# Patient Record
Sex: FEMALE | Race: WHITE | NOT HISPANIC OR LATINO | Employment: FULL TIME | URBAN - METROPOLITAN AREA
[De-identification: names, ages, dates, MRNs, and addresses within clinical notes are randomized per-mention and may not be internally consistent; named-entity substitution may affect disease eponyms.]

---

## 2019-02-17 ENCOUNTER — OFFICE VISIT (OUTPATIENT)
Dept: URGENT CARE | Facility: CLINIC | Age: 29
End: 2019-02-17
Payer: MEDICAID

## 2019-02-17 VITALS
TEMPERATURE: 97.9 F | BODY MASS INDEX: 24.99 KG/M2 | OXYGEN SATURATION: 97 % | WEIGHT: 150 LBS | HEIGHT: 65 IN | RESPIRATION RATE: 16 BRPM | SYSTOLIC BLOOD PRESSURE: 116 MMHG | HEART RATE: 104 BPM | DIASTOLIC BLOOD PRESSURE: 68 MMHG

## 2019-02-17 DIAGNOSIS — R68.89 FLU-LIKE SYMPTOMS: ICD-10-CM

## 2019-02-17 DIAGNOSIS — J06.9 VIRAL URI WITH COUGH: ICD-10-CM

## 2019-02-17 LAB
FLUAV+FLUBV AG SPEC QL IA: NEGATIVE
INT CON NEG: NEGATIVE
INT CON POS: POSITIVE

## 2019-02-17 PROCEDURE — 87804 INFLUENZA ASSAY W/OPTIC: CPT | Performed by: NURSE PRACTITIONER

## 2019-02-17 PROCEDURE — 99202 OFFICE O/P NEW SF 15 MIN: CPT | Performed by: NURSE PRACTITIONER

## 2019-02-17 ASSESSMENT — ENCOUNTER SYMPTOMS
FEVER: 0
WHEEZING: 0
CHILLS: 0
MYALGIAS: 1
DIARRHEA: 0
SORE THROAT: 0
COUGH: 1
SPUTUM PRODUCTION: 1
ORTHOPNEA: 0
EYE DISCHARGE: 0
NAUSEA: 0
HEADACHES: 1
SHORTNESS OF BREATH: 0

## 2019-02-17 NOTE — LETTER
February 17, 2019        Brittney Travis  [address] [place] [id number]        Brittney was seen in our clinic today and she is cleared to return to work tomorrow.  If you have any questions or concerns, please don't hesitate to call.        Sincerely,        NETO Grover.CARMEN.    Electronically Signed

## 2019-02-17 NOTE — PROGRESS NOTES
"Subjective:      Brittney Travis is a 28 y.o. female who presents with Cough (x1 day, productive cough with clear mucus, wants a doctors note); Runny Nose (x1 day, lethargic); and Ear Fullness (x1 day, left ear)            HPI New problem. 28 year old female with cough and congestion x 2 days. She denies any fever but had chills last night as well as body aches. She has no shortness of breath, wheezing or sore throat, no nausea or diarrhea. Taking vitamin C as well as len seltzer. No flu shot this year.  Patient has no known allergies.  No current outpatient prescriptions on file prior to visit.     No current facility-administered medications on file prior to visit.      Social History     Social History   • Marital status: Single     Spouse name: N/A   • Number of children: N/A   • Years of education: N/A     Occupational History   • Not on file.     Social History Main Topics   • Smoking status: Current Every Day Smoker   • Smokeless tobacco: Never Used   • Alcohol use Not on file   • Drug use: Unknown   • Sexual activity: Not on file     Other Topics Concern   • Not on file     Social History Narrative   • No narrative on file     family history is not on file.      Review of Systems   Constitutional: Positive for malaise/fatigue. Negative for chills and fever.   HENT: Positive for congestion. Negative for sore throat.         +left ear fullness   Eyes: Negative for discharge.   Respiratory: Positive for cough and sputum production. Negative for shortness of breath and wheezing.    Cardiovascular: Negative for chest pain and orthopnea.   Gastrointestinal: Negative for diarrhea and nausea.   Musculoskeletal: Positive for myalgias.   Neurological: Positive for headaches.   Endo/Heme/Allergies: Negative for environmental allergies.          Objective:     /68   Pulse (!) 104   Temp 36.6 °C (97.9 °F)   Resp 16   Ht 1.651 m (5' 5\")   Wt 68 kg (150 lb)   SpO2 97%   BMI 24.96 kg/m²      Physical Exam "   Constitutional: She is oriented to person, place, and time. She appears well-developed and well-nourished. No distress.   HENT:   Head: Normocephalic and atraumatic.   Right Ear: External ear and ear canal normal. Tympanic membrane is not injected and not perforated. A middle ear effusion is present.   Left Ear: External ear and ear canal normal. Tympanic membrane is not injected and not perforated.  No middle ear effusion.   Nose: Mucosal edema present.   Mouth/Throat: Posterior oropharyngeal erythema present. No oropharyngeal exudate.   Eyes: Conjunctivae are normal. Right eye exhibits no discharge. Left eye exhibits no discharge.   Neck: Normal range of motion. Neck supple.   Cardiovascular: Normal rate, regular rhythm and normal heart sounds.    No murmur heard.  Pulmonary/Chest: Effort normal and breath sounds normal. No respiratory distress.   Musculoskeletal: Normal range of motion.   Normal movement of all 4 extremities.   Lymphadenopathy:     She has no cervical adenopathy.        Right: No supraclavicular adenopathy present.        Left: No supraclavicular adenopathy present.   Neurological: She is alert and oriented to person, place, and time. Gait normal.   Skin: Skin is warm and dry.   Psychiatric: She has a normal mood and affect. Her behavior is normal. Thought content normal.   Nursing note and vitals reviewed.              Assessment/Plan:     1. Viral URI with cough     2. Flu-like symptoms  POCT Influenza A/B     Influenza negative.  Viral illness at this time with no indication for antibiotics. Reviewed with patient expected course of illness and also reviewed OTC medications that may be used for symptom relief. Follow up 7-10 days if not improving.  Work note.

## 2019-10-31 ENCOUNTER — OFFICE VISIT (OUTPATIENT)
Dept: URGENT CARE | Facility: CLINIC | Age: 29
End: 2019-10-31
Payer: COMMERCIAL

## 2019-10-31 VITALS
OXYGEN SATURATION: 98 % | BODY MASS INDEX: 27.18 KG/M2 | DIASTOLIC BLOOD PRESSURE: 72 MMHG | HEART RATE: 98 BPM | RESPIRATION RATE: 14 BRPM | WEIGHT: 163.14 LBS | SYSTOLIC BLOOD PRESSURE: 118 MMHG | TEMPERATURE: 98.1 F | HEIGHT: 65 IN

## 2019-10-31 DIAGNOSIS — Z80.8 FAMILY HISTORY OF THYROID CANCER: ICD-10-CM

## 2019-10-31 DIAGNOSIS — R53.1 WEAKNESS: ICD-10-CM

## 2019-10-31 DIAGNOSIS — J02.9 SORE THROAT: ICD-10-CM

## 2019-10-31 DIAGNOSIS — R53.83 FATIGUE, UNSPECIFIED TYPE: ICD-10-CM

## 2019-10-31 LAB
FLUAV+FLUBV AG SPEC QL IA: NEGATIVE
INT CON NEG: NORMAL
INT CON NEG: NORMAL
INT CON POS: NORMAL
INT CON POS: NORMAL
S PYO AG THROAT QL: NEGATIVE

## 2019-10-31 PROCEDURE — 87880 STREP A ASSAY W/OPTIC: CPT | Performed by: PHYSICIAN ASSISTANT

## 2019-10-31 PROCEDURE — 87804 INFLUENZA ASSAY W/OPTIC: CPT | Performed by: PHYSICIAN ASSISTANT

## 2019-10-31 PROCEDURE — 99214 OFFICE O/P EST MOD 30 MIN: CPT | Performed by: PHYSICIAN ASSISTANT

## 2019-10-31 ASSESSMENT — ENCOUNTER SYMPTOMS
VOMITING: 0
FEVER: 0
DIARRHEA: 0
RESPIRATORY NEGATIVE: 1
NAUSEA: 0
ABDOMINAL PAIN: 0
EYES NEGATIVE: 1
MYALGIAS: 1
DIAPHORESIS: 0
SORE THROAT: 1
PALPITATIONS: 0
FALLS: 0
BRUISES/BLEEDS EASILY: 0
POLYDIPSIA: 0
CHILLS: 0
WEIGHT LOSS: 1

## 2019-10-31 NOTE — LETTER
October 31, 2019    To Whom It May Concern:         This is confirmation that Brittney Travis attended her scheduled appointment with Tano Lomax P.A.-C. on 10/31/19. Please excuse her from work today.         If you have any questions please do not hesitate to call me at the phone number listed below.    Sincerely,          Tano Lomax P.A.-C.  [phone]

## 2019-10-31 NOTE — PROGRESS NOTES
Subjective:   Brittney Travis is a 29 y.o. female who presents for Pharyngitis (x 1 day / fatigue)        This is a new problem.  Patient presents with complaints of weakness, fatigue, intermittent racing heart, thyroid/anterior neck pain, emotional lability x 2 years.  She states symptoms are worse in the last 3 months.  She was scheduled to have an appointment with a new PCP over a month ago.  She states that she chose not to attend this appointment due to anxiety.  However she would like to have all these concerns addressed today.  She states that weakness was worse after the birth of her son and has mildly improved.  Emotional lability is only present during her menstrual cycle.  Fatigue is fairly constant.  Episodes of racing heart occur rarely, but may increase with increased anxiety.  Body aches are generalized and bilateral.  Pain moves.  No other aggravating or alleviating factors.  She does not take any daily medications.  She eats a diet rich in vegetables and lean proteins.  She is attempting to eliminate dairy and gluten from her diet.  Admits to recent weight gain and feeling cold often.  PMH significant for thyroid nodule.  Family hx- sister has thyroid cancer. Mother has hypothyroidism.      Review of Systems   Constitutional: Positive for malaise/fatigue and weight loss. Negative for chills, diaphoresis and fever.   HENT: Positive for sore throat.    Eyes: Negative.    Respiratory: Negative.    Cardiovascular: Negative for chest pain and palpitations.        Racing heart on occasion   Gastrointestinal: Negative for abdominal pain, diarrhea, nausea and vomiting.   Genitourinary: Negative.    Musculoskeletal: Positive for myalgias. Negative for falls.   Skin: Negative.    Endo/Heme/Allergies: Negative for environmental allergies and polydipsia. Does not bruise/bleed easily.   All other systems reviewed and are negative.      PMH: thyroid nodule per pt  MEDS: no daily meds or supplements  ALLERGIES:  "  Allergies   Allergen Reactions   • Amoxicillin Hives     SURGHX: No past surgical history on file.  SOCHX:  reports that she has quit smoking. She has never used smokeless tobacco.  FH: Family history was reviewed, no pertinent findings to report   Objective:   /72   Pulse 98   Temp 36.7 °C (98.1 °F) (Temporal)   Resp 14   Ht 1.651 m (5' 5\")   Wt 74 kg (163 lb 2.3 oz)   LMP 09/30/2019   SpO2 98%   BMI 27.15 kg/m²   Physical Exam   Constitutional: She is oriented to person, place, and time. She appears well-developed and well-nourished.  Non-toxic appearance. No distress.   HENT:   Head: Normocephalic and atraumatic.   Right Ear: Tympanic membrane, external ear and ear canal normal.   Left Ear: Tympanic membrane, external ear and ear canal normal.   Nose: Nose normal. No mucosal edema or rhinorrhea.   Mouth/Throat: Uvula is midline, oropharynx is clear and moist and mucous membranes are normal.   Eyes: Conjunctivae and lids are normal.   No proptosis or exophthalmos.  No lid lag.   Neck: Trachea normal. Neck supple.   Thyroid mildly enlarged, texture normal.  No palpable nodules.   Cardiovascular: Normal rate, regular rhythm, S1 normal, S2 normal and normal heart sounds. Exam reveals no gallop and no friction rub.   No murmur heard.  Pulmonary/Chest: Effort normal and breath sounds normal. No respiratory distress. She has no decreased breath sounds. She has no wheezes. She has no rhonchi. She has no rales.   Abdominal: Normal appearance.   Musculoskeletal:   Normal range of motion. Exhibits no edema and no tenderness.    Neurological: She is alert and oriented to person, place, and time. No cranial nerve deficit or sensory deficit.   No tremor.   Skin: Skin is warm, dry and intact. Capillary refill takes less than 2 seconds.   No pretibial myxedema.   Psychiatric: She has a normal mood and affect. Her speech is normal and behavior is normal. Judgment and thought content normal. Cognition and memory " are normal.   Vitals reviewed.        Assessment/Plan:   1. Weakness  - TSH WITH REFLEX TO FT4; Future  - VITAMIN D,25 HYDROXY; Future  - CBC WITH DIFFERENTIAL; Future  - Comp Metabolic Panel; Future  - VITAMIN B12; Future    2. Fatigue, unspecified type  - TSH WITH REFLEX TO FT4; Future  - VITAMIN D,25 HYDROXY; Future  - CBC WITH DIFFERENTIAL; Future  - Comp Metabolic Panel; Future  - VITAMIN B12; Future    3. Sore throat  - POCT Influenza A/B  - POCT Rapid Strep A    4. Family history of thyroid cancer  - TSH WITH REFLEX TO FT4; Future    Results for orders placed or performed in visit on 02/17/19   POCT Influenza A/B   Result Value Ref Range    Rapid Influenza A-B Negative     Internal Control Positive Positive     Internal Control Negative Negative      Patient counseled on the importance of attending PCP visits.  She was advised that we have a shortage of primary care in the area and that she will not be given multiple chances if she continues to miss appts.  No evidence of hypo-or hyperthyroidism on physical exam.  However patient's thyroid does appear to be mildly enlarged.  I do not feel any distinct nodules and texture was regular.  I will obtain baseline labs to evaluate for metabolic abnormalities, vitamin deficiency, anemia.  Patient instructed to contact PCP today to remake an appointment, has urgent care is not the ideal venue to address these concerns.  Follow-up and ED precautions given.    Differential diagnosis, natural history, supportive care, and indications for immediate follow-up discussed.

## 2020-05-25 ENCOUNTER — APPOINTMENT (OUTPATIENT)
Dept: RADIOLOGY | Facility: MEDICAL CENTER | Age: 30
End: 2020-05-25
Attending: EMERGENCY MEDICINE
Payer: COMMERCIAL

## 2020-05-25 ENCOUNTER — HOSPITAL ENCOUNTER (EMERGENCY)
Facility: MEDICAL CENTER | Age: 30
End: 2020-05-25
Attending: EMERGENCY MEDICINE
Payer: COMMERCIAL

## 2020-05-25 VITALS
RESPIRATION RATE: 16 BRPM | SYSTOLIC BLOOD PRESSURE: 107 MMHG | HEART RATE: 74 BPM | TEMPERATURE: 98.7 F | WEIGHT: 155 LBS | BODY MASS INDEX: 26.46 KG/M2 | OXYGEN SATURATION: 98 % | DIASTOLIC BLOOD PRESSURE: 72 MMHG | HEIGHT: 64 IN

## 2020-05-25 DIAGNOSIS — J06.9 UPPER RESPIRATORY TRACT INFECTION, UNSPECIFIED TYPE: ICD-10-CM

## 2020-05-25 LAB
COVID ORDER STATUS COVID19: NORMAL
S PYO DNA SPEC NAA+PROBE: NOT DETECTED

## 2020-05-25 PROCEDURE — A9270 NON-COVERED ITEM OR SERVICE: HCPCS | Performed by: EMERGENCY MEDICINE

## 2020-05-25 PROCEDURE — C9803 HOPD COVID-19 SPEC COLLECT: HCPCS | Performed by: EMERGENCY MEDICINE

## 2020-05-25 PROCEDURE — 87651 STREP A DNA AMP PROBE: CPT

## 2020-05-25 PROCEDURE — 71045 X-RAY EXAM CHEST 1 VIEW: CPT

## 2020-05-25 PROCEDURE — 99284 EMERGENCY DEPT VISIT MOD MDM: CPT

## 2020-05-25 PROCEDURE — 700102 HCHG RX REV CODE 250 W/ 637 OVERRIDE(OP): Performed by: EMERGENCY MEDICINE

## 2020-05-25 RX ORDER — ACETAMINOPHEN 325 MG/1
650 TABLET ORAL ONCE
Status: COMPLETED | OUTPATIENT
Start: 2020-05-25 | End: 2020-05-25

## 2020-05-25 RX ORDER — IBUPROFEN 200 MG
600 TABLET ORAL ONCE
Status: COMPLETED | OUTPATIENT
Start: 2020-05-25 | End: 2020-05-25

## 2020-05-25 RX ADMIN — ACETAMINOPHEN 650 MG: 325 TABLET, FILM COATED ORAL at 16:53

## 2020-05-25 RX ADMIN — IBUPROFEN 600 MG: 200 TABLET, FILM COATED ORAL at 16:53

## 2020-05-25 ASSESSMENT — LIFESTYLE VARIABLES
DO YOU DRINK ALCOHOL: YES
TOTAL SCORE: 0
TOTAL SCORE: 0
EVER HAD A DRINK FIRST THING IN THE MORNING TO STEADY YOUR NERVES TO GET RID OF A HANGOVER: NO
TOTAL SCORE: 0
EVER FELT BAD OR GUILTY ABOUT YOUR DRINKING: NO
HAVE YOU EVER FELT YOU SHOULD CUT DOWN ON YOUR DRINKING: NO
HAVE PEOPLE ANNOYED YOU BY CRITICIZING YOUR DRINKING: NO
CONSUMPTION TOTAL: INCOMPLETE

## 2020-05-25 NOTE — ED PROVIDER NOTES
ED Provider Note    Scribed for Angeles Amos M.D. by Tej Sharma. 5/25/2020  4:25 PM    Primary care provider: Pcp Pt States None  Means of arrival: Private auto  History obtained from: Patient  History limited by: None  CHIEF COMPLAINT  Chief Complaint   Patient presents with   • Cough     Pt states she has had a dry non productive cough since yesterday, no OTC, no chest pain or shortness of breath       HPI  Brittney Travis is a 29 y.o. female who presents to the ER complaining of sore throat, runny nose and a dry cough which began yesterday.  Patient initially told me she did not have a fever, however she is febrile to 102 in triage.  She seemed very surprised by this.  She complains of some chest discomfort when she takes a deep breath.  She is a smoker.  She says she always feels a little short of breath.  Nothing new or different.  She is able to swallow fluids and saliva.  No ear pain.  She is had intermittent headache since yesterday.  Her boyfriend is sick with a sore throat as well.  The patient works at [employer].  She went to work today but did not feel well so she was sent home.  She is requesting a work note.  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  Otherwise she has been social distancing with exception of going to the grocery store.  She had a little bit of diarrhea today.  No nausea or vomiting.  She denies possibility of pregnancy.  No rash.  No pain or swelling in her legs.    REVIEW OF SYSTEMS  See HPI for further details.   Pertinent positives include: Sore throat, runny nose, cough (dry), some tightness in her chest with deep breathing, diarrhea, intermittent headache.  Pertinent negatives include: Stiff neck, rash, vomiting, abdominal pain, pain/swelling in the legs  All other systems are negative.    PAST MEDICAL HISTORY  History reviewed. No pertinent past  "medical history.  Esophagitis, otherwise no major medical problems.    FAMILY HISTORY  History reviewed. No pertinent family history.    SOCIAL HISTORY  Social History     Tobacco Use   • Smoking status: Current Some Day Smoker   • Smokeless tobacco: Never Used   • Tobacco comment: 3-5 cigarettes some days   Substance Use Topics   • Alcohol use: Yes     Comment: socially   • Drug use: Never      Social History     Substance and Sexual Activity   Drug Use Never       SURGICAL HISTORY  History reviewed. No pertinent surgical history.    CURRENT MEDICATIONS  Home Medications     Reviewed by Viviana Cross R.N. (Registered Nurse) on 05/25/20 at 1607  Med List Status: <None>   Medication Last Dose Status        Patient Reggie Taking any Medications                       ALLERGIES  Allergies   Allergen Reactions   • Amoxicillin Hives       PHYSICAL EXAM  VITAL SIGNS: /72   Pulse 74   Temp 37.1 °C (98.7 °F) (Temporal)   Resp 16   Ht 1.626 m (5' 4\")   Wt 70.3 kg (155 lb)   SpO2 98%   BMI 26.61 kg/m²      Constitutional: Well developed, well nourished; No acute distress; Non-toxic appearance.   HENT: Normocephalic, atraumatic; Bilateral external ears normal; Oropharynx with moist mucous membranes; No erythema or exudates in the posterior oropharynx.   Eyes: PERRL, EOMI, Conjunctiva normal. No discharge.   Neck:  Supple, nontender midline; No stridor; No nuchal rigidity.   Lymphatic: Slightly enlarged anterior cervical lymph nodes.  No posterior cervical adenopathy.   Cardiovascular: Regular rate and rhythm without murmurs, rubs, or gallop.   Thorax & Lungs: No respiratory distress, breath sounds clear to auscultation bilaterally without wheezing, rales or rhonchi. Nontender chest wall. No crepitus or subcutaneous air  Abdomen: Soft, nontender, bowel sounds normal. No obvious masses; No pulsatile masses; no rebound, guarding, or peritoneal signs.   Skin: Good color; warm and dry without rash or " "petechia.  Extremities: No edema.  Calves are nontender.  Neurologic: Alert & oriented x 4, clear speech        LABS/RADIOLOGY/PROCEDURES  Results for orders placed or performed during the hospital encounter of 05/25/20   Group A Strep by PCR    Specimen: Throat   Result Value Ref Range    Group A Strep by PCR Not Detected Not Detected   COVID/SARS CoV-2    Specimen: Nasopharyngeal; Respirate   Result Value Ref Range    COVID Order Status Received    2019-nCoV RNA ([facility])   Result Value Ref Range    2019-nCoV Source NP Swab        DX-CHEST-PORTABLE (1 VIEW)   Final Result      No acute cardiopulmonary process is seen.        DX-CHEST-PORTABLE (1 VIEW)   Final Result      No acute cardiopulmonary process is seen.        COURSE & MEDICAL DECISION MAKING  Pertinent Labs & Imaging studies reviewed. (See chart for details)      4:25 PM - Patient seen and examined at bedside.     When I went back into reevaluate the patient, she had just finished a box lunch.  She said she had no difficulty swallowing her sandwich, chips and drink.  She is feeling much better with the Tylenol and ibuprofen.  At this time she understands importance of self isolating/quarantine as her symptoms may be secondary to COVID-19.  This time send out COVID-19 test is pending.  She understands she is to call the health department for further guidance and recommendations as well.      Patient presents to the ER complaining of runny nose, sore throat, dry cough and intermittent headache since yesterday.  Her boyfriend is ill with similar symptoms.  They live together.  She works at [employer].  She reported to work today and told him she was not feeling well.  She said that they work in a \"dock her a point\" if she left work, but she was concerned that if she had COVID-19 she could be infecting other people so she decided to leave on her own.  She request a work note or some paperwork to show she was here.  With respect to her symptoms, initially she told " me she did not think she had a fever.  However, temperature was a little over 102 here in the ER today.  She is a smoker.  She says she always feels a little short of breath there is routes of her smoking.  She feels a discomfort in her chest when she takes a deep breath since the cough began yesterday.  She complains of a sore throat.  She is able to swallow fluids and saliva.  She ate a box lunch while they are in the tent and was able to swallow without difficulty.  Her voice is normal.  In fact, when I went in to examine her she was comfortably chatting with a gentleman next to her in the tent.  No drooling.  No stridor.  No trismus.  No concern for deep space neck abscess.  There is no evidence for peritonsillar abscess on examination.  Rapid strep is negative.  COVID-19 test was ordered as a send out.  Results will be back in 2 to 3 days.  Chest x-ray is negative.  No evidence for infiltrates or groundglass appearance.  O2 sat is 96% on room air.  She was given Tylenol and ibuprofen for her fever.  She is feeling much better.  At this time she has been told that her symptoms could be COVID-19.  She works at [employer] and certainly she is at high risk given the amount of people at her workplace.  She has been told to self isolate/quarantine at this time pending her COVID-19 test.  She is also to avoid contact with the public and self isolate until she has been symptom-free for 3 days.  She has been told to contact the health department.  At this time patient's vital signs are normal stable.  She is not in any distress.  I think she is safe and stable for outpatient management and discharge home.  She has been given strict return precautions and discharge instructions and understands if she gets worse in any way she must return to the ER immediately.    FINAL IMPRESSION  1. Upper respiratory tract infection, unspecified type         This dictation has been created using voice recognition software. The accuracy of  the dictation is limited by the abilities of the software. I expect there may be some errors of grammar and possibly content. I made every attempt to manually correct the errors within my dictation. However, errors related to voice recognition software may still exist and should be interpreted within the appropriate context.     Tej ESPINO (Scribe), am scribing for, and in the presence of, Angeles Amos M.D..    Electronically signed by: Tej Sharma ([email or web address]), 5/25/2020    Angeles ESPINO M.D. personally performed the services described in this documentation, as scribed by Tej Sharma in my presence, and it is both accurate and complete. C.    The note accurately reflects work and decisions made by me.  Angeles Amos M.D.  5/25/2020  5:16 PM

## 2020-05-25 NOTE — ED NOTES
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

## 2020-05-26 NOTE — ED NOTES
"Call to Lab at this time regarding ETA for strep results  Nicolasa in lab states \"we have the test, labs are back up at this time but working on it\"  "

## 2020-05-26 NOTE — ED NOTES
Patient vital signs rechecked and documented per [facility]. Patient denies any new needs at this time. Patient is up to date on poc    Patient is waiting for strep test results  rn call lab for eta,  did not specify to rn on eta at this time.   Patient is aware

## 2020-05-26 NOTE — ED NOTES
Pt was given discharge instructions and follow up care instructions  Pt was given information regarding medications to take at home and when to follow up with PCP  Information provided for at home quarantine and isolation  Pt had no further questions or concerns  Pt ambulated out of the ER without difficulty

## 2020-05-26 NOTE — DISCHARGE INSTRUCTIONS
You likely have a viral illness and may have COVID-19. At this point, a COVID-19 test has been sent to the lab.  Results will not be back for 2 to 3 days.Therefore, at this time COVID-19 is not ruled out and you will need to remain in home quarantine until all three of the following are true:  You are 7 days from symptom onset  your symptoms are improving   you have been fever free for at least 72hours.  Testing is only occurring through the [facility] district at this point; you may call them at [phone].  After a phone triage process you may or may not be tested.  If you develop significant shortness of breath, meaning that it is difficult for you to walk even short distances without having to stop and catch your breath, or you become severely dizzy and this is persistent then please return to the emergency department.  Also return to the ER for any worsening sore throat, difficulty swallowing fluids or saliva, persistent fevers over 101, persistent or worsening headache, stiff neck, rash, coughing of rust colored phlegm or blood, chest pain, abdominal pain, pain with urination, cloudy or foul-smelling urine, or for any concerns.

## 2020-05-28 ENCOUNTER — TELEPHONE (OUTPATIENT)
Dept: SCHEDULING | Facility: IMAGING CENTER | Age: 30
End: 2020-05-28

## 2020-05-28 LAB
SARS-COV-2 RNA RESP QL NAA+PROBE: NOT DETECTED
SPECIMEN SOURCE: NORMAL

## 2020-05-28 NOTE — ED NOTES
COVID-19 Test Follow Up  05/28/20      Patient tested negative for COVID-19. I have informed the patient of the result by My chart message. Encouraged to stay at home until no fever for 72 hours without the use of fever reducing medications and symptoms improving. Informed there is no need to further self-isolate for 14 days for COVID-19 unless otherwise directed by the Health Dept.     2019-nCoV Source  NP Swab     2019-nCoV RNA Interp  Not detected        Chaya Arora, PharmD

## 2020-06-11 ENCOUNTER — OFFICE VISIT (OUTPATIENT)
Dept: MEDICAL GROUP | Facility: MEDICAL CENTER | Age: 30
End: 2020-06-11
Payer: COMMERCIAL

## 2020-06-11 VITALS
WEIGHT: 157 LBS | TEMPERATURE: 97.2 F | SYSTOLIC BLOOD PRESSURE: 110 MMHG | HEIGHT: 65 IN | DIASTOLIC BLOOD PRESSURE: 70 MMHG | RESPIRATION RATE: 16 BRPM | BODY MASS INDEX: 26.16 KG/M2 | HEART RATE: 74 BPM | OXYGEN SATURATION: 97 %

## 2020-06-11 DIAGNOSIS — F33.0 MILD EPISODE OF RECURRENT MAJOR DEPRESSIVE DISORDER (HCC): ICD-10-CM

## 2020-06-11 DIAGNOSIS — F41.9 ANXIETY: ICD-10-CM

## 2020-06-11 DIAGNOSIS — Z13.6 SCREENING FOR CARDIOVASCULAR CONDITION: ICD-10-CM

## 2020-06-11 DIAGNOSIS — Z87.81 HX OF FRACTURE OF FEMUR: ICD-10-CM

## 2020-06-11 DIAGNOSIS — T14.90XA TRAUMA: ICD-10-CM

## 2020-06-11 DIAGNOSIS — Z72.0 TOBACCO USE: ICD-10-CM

## 2020-06-11 DIAGNOSIS — E04.1 THYROID NODULE: ICD-10-CM

## 2020-06-11 DIAGNOSIS — M54.9 UPPER BACK PAIN: ICD-10-CM

## 2020-06-11 DIAGNOSIS — Z12.4 SCREENING FOR CERVICAL CANCER: ICD-10-CM

## 2020-06-11 PROCEDURE — 99214 OFFICE O/P EST MOD 30 MIN: CPT | Performed by: FAMILY MEDICINE

## 2020-06-11 ASSESSMENT — PATIENT HEALTH QUESTIONNAIRE - PHQ9
5. POOR APPETITE OR OVEREATING: 1 - SEVERAL DAYS
SUM OF ALL RESPONSES TO PHQ QUESTIONS 1-9: 11
CLINICAL INTERPRETATION OF PHQ2 SCORE: 5

## 2020-06-11 NOTE — PROGRESS NOTES
"cc: Thyroid concerns    Subjective:     Brittney Travis is a 29 y.o. female presenting to [facility] care:    1.  Thyroid concerns: She feels like her thyroid has been swollen for the past several years.  She reports being diagnosed with a nodule several years ago and ultrasound, never had follow-up after that.  She occasionally has hot flashes, cold intolerance, chills, muscle aches.  Denies any diarrhea, constipation, tremors, skin changes.    2.  Mood issues: She reports a significant amount of anxiety and depression over the past several years.  She attributes it mostly to her thyroid or \"hormones.\"  However, she does describe persistent depression, anhedonia, poor concentration, poor sleep.  She also has panic attacks with no particular triggers.  She feels angry, has mood swings.  She reports a history of trauma, declines to go into details.  She is interested in seeing a therapist.  She does have a family history of mental health issues.  Phq9: 11    3.  Back pain: She describes upper back pain for the past 6 months, seems to be somewhat worsening.  Describes mostly stiffness.  Pain does not radiate.  Denies any numbness, tingling, redness, swelling, rashes, injuries.  Is able to function well.  She tried a brace, but it was quite uncomfortable.  She continues to do stretching exercises.      Review of systems:  See above.  Positive for mild dry cough after an upper respiratory infection several weeks ago.  All other systems are reviewed and are negative    No current outpatient medications on file.    Allergies, past medical history, past surgical history, family history, social history reviewed and updated    Objective:     Vitals: /70   Pulse 74   Temp 36.2 °C (97.2 °F)   Resp 16   Ht 1.651 m (5' 5\")   Wt 71.2 kg (157 lb)   SpO2 97%   BMI 26.13 kg/m²   General: Alert, pleasant, NAD  HEENT: Normocephalic.  EOMI, no icterus or pallor.  Conjunctivae and lids normal. External ears normal. Oropharynx " non-erythematous, mucous membranes moist.  Neck supple.  Thyroid feels diffusely enlarged.  No nodules identified.  No cervical or supraclavicular lymphadenopathy.  Heart: Regular rate and rhythm.  S1 and S2 normal.  No murmurs appreciated.  Respiratory: Normal respiratory effort.  Clear to auscultation bilaterally.  Abdomen: Non-distended, soft  Skin: Warm, dry, no rashes.  Musculoskeletal: Gait is normal.  Moves all extremities well.  Normal spine  Extremities: No leg edema.  Psych:  Affect/mood is normal, judgement is good, memory is intact, grooming is appropriate.    Assessment/Plan:     Brittney was seen today for [facility] care.    Diagnoses and all orders for this visit:    Thyroid nodule  New problem, will check the following labs, thyroid ultrasound.  Follow-up in 3 months  -     CBC WITHOUT DIFFERENTIAL; Future  -     Comp Metabolic Panel; Future  -     TRIIDOTHYRONINE; Future  -     FREE THYROXINE; Future  -     TSH; Future  -     THYROID PEROXIDASE  (TPO) AB; Future  -     THYROGLOBULIN, QT; Future  -     US-THYROID; Future    Anxiety  Mild episode of recurrent major depressive disorder (HCC)  Trauma  New problem, referral to counseling placed.  We discussed potentially starting medications in the future if needed.  -     Patient has been identified as having a positive depression screening. Appropriate orders and counseling have been given.  -     REFERRAL TO PSYCHOLOGY    Upper back pain  Hx of fracture of femur  New problem, recommended a course of physical therapy  -     REFERRAL TO PHYSICAL THERAPY Reason for Therapy: Eval/Treat/Report    Tobacco use  Advised to quit, patient is working on this    Screening for cervical cancer  -     REFERRAL TO GYNECOLOGY    Screening for cardiovascular condition  -     Lipid Profile; Future      Paperwork for return to work filled out for patient.      Return in about 3 months (around 9/11/2020) for routine follow up.

## 2020-08-28 ENCOUNTER — APPOINTMENT (OUTPATIENT)
Dept: RADIOLOGY | Facility: MEDICAL CENTER | Age: 30
End: 2020-08-28
Attending: FAMILY MEDICINE
Payer: COMMERCIAL

## 2023-07-18 ENCOUNTER — TELEPHONE (OUTPATIENT)
Dept: HEALTH INFORMATION MANAGEMENT | Facility: OTHER | Age: 33
End: 2023-07-18
Payer: MEDICAID

## 2023-09-27 ENCOUNTER — DOCUMENTATION (OUTPATIENT)
Dept: HEALTH INFORMATION MANAGEMENT | Facility: OTHER | Age: 33
End: 2023-09-27
Payer: MEDICAID